# Patient Record
Sex: MALE | Race: WHITE | URBAN - METROPOLITAN AREA
[De-identification: names, ages, dates, MRNs, and addresses within clinical notes are randomized per-mention and may not be internally consistent; named-entity substitution may affect disease eponyms.]

---

## 2017-01-01 ENCOUNTER — HOSPITAL ENCOUNTER (OUTPATIENT)
Dept: WOUND CARE | Facility: CLINIC | Age: 67
Discharge: HOME | End: 2017-04-05
Attending: SURGERY | Admitting: SURGERY
Payer: MEDICARE

## 2017-01-01 ENCOUNTER — TRANSFERRED RECORDS (OUTPATIENT)
Dept: HEALTH INFORMATION MANAGEMENT | Facility: CLINIC | Age: 67
End: 2017-01-01

## 2017-01-01 DIAGNOSIS — R60.0 LOCALIZED EDEMA: ICD-10-CM

## 2017-01-01 DIAGNOSIS — I87.2 STASIS DERMATITIS OF BOTH LEGS: Primary | ICD-10-CM

## 2017-01-01 DIAGNOSIS — Z87.2 HISTORY OF SKIN ULCER OF LOWER EXTREMITY: ICD-10-CM

## 2017-01-01 PROCEDURE — 99214 OFFICE O/P EST MOD 30 MIN: CPT

## 2017-01-01 PROCEDURE — 99213 OFFICE O/P EST LOW 20 MIN: CPT | Performed by: SURGERY

## 2017-01-01 RX ORDER — IPRATROPIUM BROMIDE AND ALBUTEROL SULFATE 2.5; .5 MG/3ML; MG/3ML
3 SOLUTION RESPIRATORY (INHALATION) EVERY 4 HOURS PRN
COMMUNITY
Start: 2015-01-29

## 2017-01-01 RX ORDER — ALBUTEROL SULFATE 90 UG/1
2 AEROSOL, METERED RESPIRATORY (INHALATION) EVERY 4 HOURS PRN
COMMUNITY
Start: 2015-08-13

## 2017-01-01 RX ORDER — MULTIVIT-MIN/IRON FUM/FOLIC AC 7.5 MG-4
1 TABLET ORAL DAILY
COMMUNITY
Start: 2015-01-29

## 2017-01-01 RX ORDER — CARVEDILOL 12.5 MG/1
12.5 TABLET ORAL DAILY
COMMUNITY
Start: 2015-08-13

## 2017-01-01 RX ORDER — ATORVASTATIN CALCIUM 40 MG/1
40 TABLET, FILM COATED ORAL DAILY
COMMUNITY
Start: 2016-01-01

## 2017-01-01 RX ORDER — TERAZOSIN 10 MG/1
10 CAPSULE ORAL AT BEDTIME
COMMUNITY
Start: 2015-08-13

## 2017-01-01 RX ORDER — NITROGLYCERIN 0.4 MG/1
0.4 TABLET SUBLINGUAL PRN
COMMUNITY

## 2017-01-01 RX ORDER — CITALOPRAM HYDROBROMIDE 20 MG/1
20 TABLET ORAL DAILY
COMMUNITY
Start: 2015-08-13

## 2017-01-01 RX ORDER — ACETAMINOPHEN 500 MG
1000 TABLET ORAL 2 TIMES DAILY
COMMUNITY
Start: 2015-08-13

## 2017-01-01 RX ORDER — BACLOFEN 10 MG/1
30 TABLET ORAL 3 TIMES DAILY
COMMUNITY
Start: 2015-08-13

## 2017-01-01 RX ORDER — BUMETANIDE 1 MG/1
1 TABLET ORAL DAILY
COMMUNITY
Start: 2016-01-01

## 2017-01-01 RX ORDER — TIOTROPIUM BROMIDE 18 UG/1
18 CAPSULE ORAL; RESPIRATORY (INHALATION) DAILY
COMMUNITY
Start: 2015-08-13

## 2017-04-05 NOTE — PROGRESS NOTES
[facility]      DATE OF VISIT:  04/05/2017.       PRESENTING COMPLAINT:  Mr. Sergio Jerez is a 66-year-old gentleman who was referred to [facility] by Dr. Kenneth Pallas for bilateral weeping lower extremities.  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  At any rate, the patient thinks that in the last 6 months or so, he developed sores on his lower extremities.  The sores would weep and he was treated for time with unna boots.  From reviewing the medical records I can see that he has had a referral [facility] as far back as 2009.  At any rate, he was referred to the [facility] for our recommendations.      PAST MEDICAL HISTORY:  The patient again had a CVA in 1998, has a history of congestive heart failure, type 2 diabetes, hyperlipidemia, hypertension and morbid obesity.  He also has asthma and is on home O2.      MEDICATIONS:  See medical H&P for current medications.      PHYSICAL EXAMINATION:   GENERAL:  The patient is in a wheelchair and is here with his daughter's friend.  The daughter could not be here today.  He cannot give much much in the way of history except yes and no answers.   VITAL SIGNS:  Today reveal a temperature 98.4, blood pressure 188/86, pulse of 82 and respirations of 28.  Again, he has O2 by nasal prongs today.  His most recent blood sugar was 113.   EXTREMITIES:  Examining his lower extremities, he does have bilateral edema, worse on the right.  He also has stasis dermatitis and hyperkeratotic skin on both lower extremities, more on the right than the left.  Today there are no open wounds.  The redness on the left leg extends to mid calf and nearer to the knee on the right.  The patient's toes are warm and I do feel dorsalis pedis pulse bilaterally but no posterior tibial.  I do not believe this has anything to do with the arterial circulation.      IMPRESSION:  A  66-year-old hemiparetic gentleman who I believe has history of venous ulcers that are currently healed.  At this point, the problem is to keep the skin on his legs intact.  I think the best option is to get him back to the [facility] and see what their recommendations would be for edema control.  I also did discuss with the patient the possibility of having a vein study to see if there is a correctable lesion but because of his home O2 and comorbidities, I do not know if he would be a good candidate for that.  He has seen a vascular surgeon in [place].  I would think that if they thought he was a candidate for any invasive procedures they would have studied him.  I will talk to our vascular surgeons, however, and just see if given his medical condition if they would think it would be beneficial to see them.  We will arrange at the [facility] consultation and followup the patient on a p.r.n. basis.         SB ARRIAZA MD             D: 2017 11:27   T: 2017 12:47   MT: SHELBY      Name:     JIMENEZ NAVARRO   MRN:      [id number]        Account:      [id number]   :      1950           Visit Date:   2017      Document: [id number]       cc: Kenneth Pallas MD